# Patient Record
Sex: MALE | Race: WHITE | URBAN - METROPOLITAN AREA
[De-identification: names, ages, dates, MRNs, and addresses within clinical notes are randomized per-mention and may not be internally consistent; named-entity substitution may affect disease eponyms.]

---

## 2017-10-03 NOTE — PROGRESS NOTES
"Immunizations updated from [facility].   Tdap vaccine- Declined   Flu Vaccine-Declined  PETER- 7 score was 3; documented in EPIC  PHQ-9 score was 7; documented in EPIC  Not aware of any family history of anxiety or depression.   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 Medication,  Found this on line as an idea Modafinil a wakefulness-promoting drug; would like to know about it. Graduated in  May 2017 and started new job in August as an  at [employer]. Since starting new job, has been having difficulties with staying and remaining focused and alert for work; only at work.     He read about \"nutropics\"  He was asking about Modafinil. I reviewed the risks of this drug and did NOT prescribe this today.      \"discuss how to be more focused\"    Estimated body mass index is 39.88 kg/(m^2) as calculated from the following:    Height as of this encounter: 1.816 m (5' 11.5\").    Weight as of this encounter: 131.5 kg (290 lb).  Weight loss recommended. Offered referral to weight loss clinic- deferred by patient.    Sleep: 8 hours of sleep; no difficulties with falling or staying asleep / Denies JACQUELINE/RLS  Nocturia: none   Appetite: Normal; but has occasional late night snacks   Exercise: Biking to and from work; about 20-40 minutes and about 4 days per week     Smokin cigarette per week; socially (encouraged him to quit)  ETOH: 3 drinks per week   Street drugs/MJ: Marijuana- daily use (encouraged him to quit)  Caffeine: 8-10 cups of coffee which is approximately about 800 mg of caffeine per day; except on weekend when he is \"more relaxed and there is less pressure\".     Education: BS Accounting and Finance " "  Job:  at [employer]   Hobbies: photography, cooking, and baking   Relationships: not currently in a relationship; single     Depression NO  Anxiety YES, reports social anxiety. No counseling. No medication.  Panic NO  SI/SP no  Hallucinations NO  Paranoid NO  Manic NO  OCD NO  PTSD NO  Gambling NO  Guns no  \"Sometimes nilalist, not right now.\"    PHQ9=7  PETER=3    Parents in the past focused on prayer  Not a support currently    Best friend ?-good friends, but no one as best  Brother-fairly close      Problem list and histories reviewed & adjusted, as indicated.  Additional history: as documented    Patient Active Problem List   Diagnosis     Health Care Home     No past surgical history on file.    Social History   Substance Use Topics     Smoking status: Current Some Day Smoker     Types: Cigarettes     Smokeless tobacco: Never Used      Comment: 1 cigarette per week; socially      Alcohol use Yes      Comment: 3 drinks per week      Family History   Problem Relation Age of Onset     Hyperlipidemia Father      Hypertension Father      CANCER Maternal Grandmother      CANCER Maternal Grandfather      Myocardial Infarction Paternal Grandfather              Reviewed and updated as needed this visit by clinical staff       Reviewed and updated as needed this visit by Provider       Sometimes not motivated to do things.  Biking to work. Helmet no, yes light, reflective clothing no      Trouble with focus on task   Examples Excel spreadsheets longer periods then was in , shared cubicle 1/2 wall. Quiet. He says listening to things all day. Consider taking breaks. Consider setting timer.  Getting distracted  Sometimes distracted in college, but that time was more flexible  (Consider Adult ADD inattentive- referred for evaluation)    No cataplexy    No abuse    Chem dep not other than MJ    Learning issues as child no  LD no  ADD as child no    Snore more as a child  RLS no  Apnea no    Heavy sleeper per his " "report    Tetanus ?  Flu declined today     ROS: 10 point ROS neg other than the symptoms noted above in the HPI.  Wondering about allergy fall/spring runny nose tried benadryl, zyrtec working better, Claritin not as good as zyrtec.   /80  Pulse 66  Temp 99.1  F (37.3  C) (Oral)  Resp 16  Ht 1.816 m (5' 11.5\")  Wt 131.5 kg (290 lb)  SpO2 97%  BMI 39.88 kg/m2  Exam:  Constitutional: healthy, alert and no distress  Head: Normocephalic. No masses, lesions, tenderness or abnormalities  Neck: Neck supple. No adenopathy. Thyroid symmetric, normal size,, Carotids without bruits.  ENT: ENT exam normal, no neck nodes or sinus tenderness  Cardiovascular: negative, PMI normal. No lifts, heaves, or thrills. RRR. No murmurs, clicks gallops or rub  Respiratory: negative, Percussion normal. Good diaphragmatic excursion. Lungs clear  Gastrointestinal: Abdomen soft, non-tender. BS normal. No masses, organomegaly  : Deferred  Musculoskeletal: extremities normal- no gross deformities noted, gait normal and normal muscle tone  Skin: no suspicious lesions or rashes  Neurologic: Gait normal. Reflexes normal and symmetric. Sensation grossly WNL.  Psychiatric: mentation appears normal and affect normal/bright  Hematologic/Lymphatic/Immunologic: Normal cervical lymph nodes    Diet   Breakfast yogurt greek, occasional oatmeal  Lunch meat and cheese, granola bar, fruit strip  Dinner with family pasta, meatloaf, hamburger helper, veggies beans, corn, peas  Snacks sweets, cookies, ice cream- boredom sometime    Consider reduction of portions/use smaller plates  Late snacking is an issue for him  Consider pre-plan snacks  Consider volunteering  Consider jointing photography class/club      TV falling asleep all the time. 40 min on average  Reading a book 20 min  (Consider sleep consult- not ordered during this appointment)    FHX positive for Hyperlipidemia/HTN/Cancer/Heart disease    ASSESSMENT / PLAN:  (Z76.89) Establishing care " with new doctor, encounter for  (primary encounter diagnosis)  Comment:   Plan: updated history, HCM reviewed    (F90.0) Attention deficit hyperactivity disorder (ADHD), predominantly inattentive type  Comment: consider this as a possible diagnosis. Sent for assessment. Discussed behavioral changes to consider.  Plan: MENTAL HEALTH REFERRAL          (F40.10) Social anxiety disorder  Comment: trial of Paxil. Discussed [place] Volunteer as a way to decrease boredom isolation and increase purpose. Use his photography interest also to practice networking/social skills. Consider counseling. Discussed phone APPS for meditation etc.  Plan: MENTAL HEALTH REFERRAL, PARoxetine (PAXIL) 20         MG tablet            (E66.9) Obesity (BMI 30-39.9)  Comment: Consider sleep consult in the future. Discussed in detail weight loss suggestions.Discussed My Fitness Pal UDAY as one monitoring option.  Plan: Diet and exercise. Consider referral in the future per patient desire.     (F12.90) Marijuana use, episodic  Comment:   Plan: Encouraged him to eliminate this    Vera Murphy MD, MEd      60 min spent in direct face to face time with this pt, greater than 50% in counseling  Re-establish care,Mood, weight, focus issues and coordination of care.

## 2017-10-09 ENCOUNTER — OFFICE VISIT (OUTPATIENT)
Dept: FAMILY MEDICINE | Facility: CLINIC | Age: 22
End: 2017-10-09

## 2017-10-09 VITALS
HEART RATE: 66 BPM | DIASTOLIC BLOOD PRESSURE: 80 MMHG | HEIGHT: 72 IN | WEIGHT: 290 LBS | SYSTOLIC BLOOD PRESSURE: 140 MMHG | BODY MASS INDEX: 39.28 KG/M2 | RESPIRATION RATE: 16 BRPM | OXYGEN SATURATION: 97 % | TEMPERATURE: 99.1 F

## 2017-10-09 DIAGNOSIS — F90.0 ATTENTION DEFICIT HYPERACTIVITY DISORDER (ADHD), PREDOMINANTLY INATTENTIVE TYPE: ICD-10-CM

## 2017-10-09 DIAGNOSIS — F40.10 SOCIAL ANXIETY DISORDER: ICD-10-CM

## 2017-10-09 DIAGNOSIS — E66.9 OBESITY (BMI 30-39.9): ICD-10-CM

## 2017-10-09 DIAGNOSIS — F12.90 MARIJUANA USE, EPISODIC: ICD-10-CM

## 2017-10-09 DIAGNOSIS — Z76.89 ESTABLISHING CARE WITH NEW DOCTOR, ENCOUNTER FOR: Primary | ICD-10-CM

## 2017-10-09 PROCEDURE — 99205 OFFICE O/P NEW HI 60 MIN: CPT | Performed by: FAMILY MEDICINE

## 2017-10-09 RX ORDER — PAROXETINE 20 MG/1
20 TABLET, FILM COATED ORAL AT BEDTIME
Qty: 30 TABLET | Refills: 1 | Status: SHIPPED | OUTPATIENT
Start: 2017-10-09 | End: 2018-07-12

## 2017-10-09 ASSESSMENT — ANXIETY QUESTIONNAIRES
5. BEING SO RESTLESS THAT IT IS HARD TO SIT STILL: SEVERAL DAYS
IF YOU CHECKED OFF ANY PROBLEMS ON THIS QUESTIONNAIRE, HOW DIFFICULT HAVE THESE PROBLEMS MADE IT FOR YOU TO DO YOUR WORK, TAKE CARE OF THINGS AT HOME, OR GET ALONG WITH OTHER PEOPLE: NOT DIFFICULT AT ALL
3. WORRYING TOO MUCH ABOUT DIFFERENT THINGS: NOT AT ALL
1. FEELING NERVOUS, ANXIOUS, OR ON EDGE: SEVERAL DAYS
6. BECOMING EASILY ANNOYED OR IRRITABLE: NOT AT ALL
7. FEELING AFRAID AS IF SOMETHING AWFUL MIGHT HAPPEN: NOT AT ALL
2. NOT BEING ABLE TO STOP OR CONTROL WORRYING: NOT AT ALL
GAD7 TOTAL SCORE: 3

## 2017-10-09 ASSESSMENT — PATIENT HEALTH QUESTIONNAIRE - PHQ9
SUM OF ALL RESPONSES TO PHQ QUESTIONS 1-9: 7
5. POOR APPETITE OR OVEREATING: SEVERAL DAYS

## 2017-10-09 NOTE — MR AVS SNAPSHOT
After Visit Summary   10/9/2017    Ricardo Aguilera    MRN: [id number]           Patient Information     Date Of Birth          1995        Visit Information        Provider Department      10/9/2017 4:00 PM Vera Murphy MD [facility]        Today's Diagnoses     Attention deficit hyperactivity disorder (ADHD), predominantly inattentive type    -  1    Social anxiety disorder          Care Instructions    Allergy add flonase OTC allergy eye drops and zyrtec    Meditation  Calm  Head space    My Fitness Pal.com  Use small plates    [place] Volunteer  Consider local    paxil 20mg                     Follow-ups after your visit        Additional Services     MENTAL HEALTH REFERRAL       Your provider has referred you to: [facility]: [facility] - ADHD & Bariatric Assessments - Adult ADHD Evaluations - [facility] [phone]   [email or web address]   *Please call to schedule an appointment.    All scheduling is subject to the client's specific insurance plan & benefits, provider/location availability, and provider clinical specialities.  Please arrive 15 minutes early for your first appointment and bring your completed paperwork.    Please be aware that coverage of these services is subject to the terms and limitations of your health insurance plan.  Call member services at your health plan with any benefit or coverage questions.                  Who to contact     If you have questions or need follow up information about today's clinic visit or your schedule please contact [facility] directly at [phone].  Normal or non-critical lab and imaging results will be communicated to you by MyChart, letter or phone within 4 business days after the clinic has received the results. If you do not hear from us within 7 days, please contact the clinic through MyChart or phone. If you have a critical  "or abnormal lab result, we will notify you by phone as soon as possible.  Submit refill requests through [employer] or call your pharmacy and they will forward the refill request to us. Please allow 3 business days for your refill to be completed.          Additional Information About Your Visit        iovoxhart Information     [employer] lets you send messages to your doctor, view your test results, renew your prescriptions, schedule appointments and more. To sign up, go to [email or web address] . Click on \"Log in\" on the left side of the screen, which will take you to the Welcome page. Then click on \"Sign up Now\" on the right side of the page.     You will be asked to enter the access code listed below, as well as some personal information. Please follow the directions to create your username and password.     Your access code is: [email or web address]  Expires: 2018  5:18 PM     Your access code will  in 90 days. If you need help or a new code, please call your [facility] clinic or [phone].        Care EveryWhere ID     This is your Care EveryWhere ID. This could be used by other organizations to access your [facility] medical records  [id number]        Your Vitals Were     Pulse Temperature Respirations Height Pulse Oximetry BMI (Body Mass Index)    66 99.1  F (37.3  C) (Oral) 16 1.816 m (5' 11.5\") 97% 39.88 kg/m2       Blood Pressure from Last 3 Encounters:   10/09/17 140/80   14 110/82   14 118/82    Weight from Last 3 Encounters:   10/09/17 131.5 kg (290 lb)   14 (!) 136.3 kg (300 lb 6.4 oz) (>99 %)*   14 135 kg (297 lb 9.6 oz) (>99 %)*     * Growth percentiles are based on CDC 2-20 Years data.              We Performed the Following     MENTAL HEALTH REFERRAL          Today's Medication Changes          These changes are accurate as of: 10/9/17  5:18 PM.  If you have any questions, ask your nurse or doctor.               Start taking these medicines.        Dose/Directions    PARoxetine " 20 MG tablet   Commonly known as:  PAXIL   Used for:  Social anxiety disorder   Started by:  Vera Murphy MD        Dose:  20 mg   Take 1 tablet (20 mg) by mouth At Bedtime   Quantity:  30 tablet   Refills:  1            Where to get your medicines      These medications were sent to [id number] - [place], [place] - [address]  [address], [place] [address]     Phone:  [phone]     PARoxetine 20 MG tablet                Primary Care Provider Office Phone # Fax #    Vera Murphy [phone] [address] [place] [address]        Equal Access to Services     [facility]: Hadii aad ku hadasho Soomaali, waaxda luqadaha, qaybta kaalmada adeegyada, waxfelicia branham . So [facility] [phone].    ATENCIÓN: Si habla español, tiene a starr disposición servicios gratuitos de asistencia lingüística. Llame al [phone].    We comply with applicable federal civil rights laws and [place] laws. We do not discriminate on the basis of race, color, national origin, age, disability, sex, sexual orientation, or gender identity.            Thank you!     Thank you for choosing [facility]  for your care. Our goal is always to provide you with excellent care. Hearing back from our patients is one way we can continue to improve our services. Please take a few minutes to complete the written survey that you may receive in the mail after your visit with us. Thank you!             Your Updated Medication List - Protect others around you: Learn how to safely use, store and throw away your medicines at www.disposemymeds.org.          This list is accurate as of: 10/9/17  5:18 PM.  Always use your most recent med list.                   Brand Name Dispense Instructions for use Diagnosis    acetaminophen-codeine 300-30 MG per tablet    TYLENOL w/CODEINE No. 3    24 tablet    Take 1-2 tablets by mouth every 4 hours as needed for pain    Warts        cimetidine 400 MG tablet    TAGAMET    60 tablet    Take 1 tablet (400 mg) by mouth 2 times daily    Warts       PARoxetine 20 MG tablet    PAXIL    30 tablet    Take 1 tablet (20 mg) by mouth At Bedtime    Social anxiety disorder

## 2017-10-09 NOTE — PATIENT INSTRUCTIONS
Allergy add flonase OTC allergy eye drops and zyrtec    Meditation  Calm  Head space    My Fitness Pal.com  Use small plates    [place] Volunteer  Consider local    paxil 20mg

## 2017-10-10 PROBLEM — F90.0 ATTENTION DEFICIT HYPERACTIVITY DISORDER (ADHD), PREDOMINANTLY INATTENTIVE TYPE: Status: ACTIVE | Noted: 2017-10-10

## 2017-10-10 PROBLEM — E66.9 OBESITY (BMI 30-39.9): Status: ACTIVE | Noted: 2017-10-10

## 2017-10-10 PROBLEM — F40.10 SOCIAL ANXIETY DISORDER: Status: ACTIVE | Noted: 2017-10-10

## 2017-10-10 PROBLEM — F12.90 MARIJUANA USE, EPISODIC: Status: ACTIVE | Noted: 2017-10-10

## 2017-10-10 ASSESSMENT — ANXIETY QUESTIONNAIRES: GAD7 TOTAL SCORE: 3

## 2018-07-12 ENCOUNTER — OFFICE VISIT (OUTPATIENT)
Dept: FAMILY MEDICINE | Facility: CLINIC | Age: 23
End: 2018-07-12

## 2018-07-12 VITALS
DIASTOLIC BLOOD PRESSURE: 80 MMHG | HEIGHT: 72 IN | RESPIRATION RATE: 16 BRPM | BODY MASS INDEX: 38.74 KG/M2 | OXYGEN SATURATION: 99 % | WEIGHT: 286 LBS | SYSTOLIC BLOOD PRESSURE: 124 MMHG | HEART RATE: 69 BPM

## 2018-07-12 DIAGNOSIS — Z23 NEED FOR PROPHYLACTIC VACCINATION AGAINST HUMAN PAPILLOMAVIRUS: ICD-10-CM

## 2018-07-12 DIAGNOSIS — E66.812 CLASS 2 OBESITY DUE TO EXCESS CALORIES WITHOUT SERIOUS COMORBIDITY WITH BODY MASS INDEX (BMI) OF 39.0 TO 39.9 IN ADULT: Chronic | ICD-10-CM

## 2018-07-12 DIAGNOSIS — Z23 NEED FOR MENINGOCOCCAL VACCINATION: ICD-10-CM

## 2018-07-12 DIAGNOSIS — F40.10 SOCIAL ANXIETY DISORDER: ICD-10-CM

## 2018-07-12 DIAGNOSIS — Z11.3 ROUTINE SCREENING FOR STI (SEXUALLY TRANSMITTED INFECTION): ICD-10-CM

## 2018-07-12 DIAGNOSIS — Z23 NEED FOR VACCINE FOR TD (TETANUS-DIPHTHERIA): ICD-10-CM

## 2018-07-12 DIAGNOSIS — Z72.0 TOBACCO USE: ICD-10-CM

## 2018-07-12 DIAGNOSIS — E66.09 CLASS 2 OBESITY DUE TO EXCESS CALORIES WITHOUT SERIOUS COMORBIDITY WITH BODY MASS INDEX (BMI) OF 39.0 TO 39.9 IN ADULT: Chronic | ICD-10-CM

## 2018-07-12 DIAGNOSIS — Z02.89 ENCOUNTER FOR COMPLETION OF FORM WITH PATIENT: Primary | ICD-10-CM

## 2018-07-12 DIAGNOSIS — Z01.83 ENCOUNTER FOR BLOOD TYPING: ICD-10-CM

## 2018-07-12 PROCEDURE — 90651 9VHPV VACCINE 2/3 DOSE IM: CPT | Performed by: FAMILY MEDICINE

## 2018-07-12 PROCEDURE — 36415 COLL VENOUS BLD VENIPUNCTURE: CPT | Performed by: FAMILY MEDICINE

## 2018-07-12 PROCEDURE — 90714 TD VACC NO PRESV 7 YRS+ IM: CPT | Performed by: FAMILY MEDICINE

## 2018-07-12 PROCEDURE — 87491 CHLMYD TRACH DNA AMP PROBE: CPT | Mod: 90 | Performed by: FAMILY MEDICINE

## 2018-07-12 PROCEDURE — 99214 OFFICE O/P EST MOD 30 MIN: CPT | Mod: 25 | Performed by: FAMILY MEDICINE

## 2018-07-12 PROCEDURE — 90472 IMMUNIZATION ADMIN EACH ADD: CPT | Performed by: FAMILY MEDICINE

## 2018-07-12 PROCEDURE — 86900 BLOOD TYPING SEROLOGIC ABO: CPT | Mod: 90 | Performed by: FAMILY MEDICINE

## 2018-07-12 PROCEDURE — 87591 N.GONORRHOEAE DNA AMP PROB: CPT | Mod: 90 | Performed by: FAMILY MEDICINE

## 2018-07-12 PROCEDURE — 90471 IMMUNIZATION ADMIN: CPT | Performed by: FAMILY MEDICINE

## 2018-07-12 PROCEDURE — 90734 MENACWYD/MENACWYCRM VACC IM: CPT | Performed by: FAMILY MEDICINE

## 2018-07-12 RX ORDER — AZITHROMYCIN 250 MG/1
500 TABLET, FILM COATED ORAL DAILY
Qty: 6 TABLET | Refills: 0 | Status: CANCELLED | OUTPATIENT
Start: 2018-07-12

## 2018-07-12 RX ORDER — ONDANSETRON 4 MG/1
4 TABLET, FILM COATED ORAL EVERY 8 HOURS PRN
Qty: 6 TABLET | Refills: 0 | Status: CANCELLED | OUTPATIENT
Start: 2018-07-12

## 2018-07-12 NOTE — LETTER
"[facility] [address] [place]  [address]  Phone: [phone]    July 13, 2018      Ricardo Aguilera  [facility] [facility] [address]              Dear Ricardo,    The results from your recent visit showed your HIV test is negative as we expected.   Your blood type is A positive.     Enjoy your time in [place]!    Sincerely,           Justa \"Quyen\" MD Ric    Results for orders placed or performed in visit on 07/12/18   HIV 1/0/2 Rflx ([facility])   Result Value Ref Range    HIV Screen 4th Gen with Rflx Non Reactive Non Reactive    Narrative    Performed at:  01 - [facility] [address], [place], [place]  [id number]  : Duong Chahal MD, Phone:  [phone]   ABO Grouping and Rho(D) Typing ([facility])   Result Value Ref Range    ABO A     Rh Factor Positive     Narrative    Performed at:  01 - [facility] [address], [place], [place]  [id number]  : Duong Chahal MD, Phone:  [phone]      "

## 2018-07-12 NOTE — MR AVS SNAPSHOT
After Visit Summary   7/12/2018    Ricardo Aguilera    MRN: [id number]           Patient Information     Date Of Birth          1995        Visit Information        Provider Department      7/12/2018 10:30 AM Justa Larios MD [facility]        Today's Diagnoses     Encounter for completion of form with patient    -  1    Encounter for blood typing        Class 2 obesity due to excess calories without serious comorbidity with body mass index (BMI) of 39.0 to 39.9 in adult        Tobacco use        Social anxiety disorder        Need for vaccine for TD (tetanus-diphtheria)        Routine screening for STI (sexually transmitted infection)        Need for meningococcal vaccination        Need for prophylactic vaccination against human papillomavirus          Care Instructions    When you bring your photo in, please also bring in the supplements you are taking so we can photocopy the label.           Follow-ups after your visit        Who to contact     If you have questions or need follow up information about today's clinic visit or your schedule please contact [facility] directly at [phone].  Normal or non-critical lab and imaging results will be communicated to you by [employer], letter or phone within 4 business days after the clinic has received the results. If you do not hear from us within 7 days, please contact the clinic through [employer] or phone. If you have a critical or abnormal lab result, we will notify you by phone as soon as possible.  Submit refill requests through [employer] or call your pharmacy and they will forward the refill request to us. Please allow 3 business days for your refill to be completed.          Additional Information About Your Visit        MyChart Information     [employer] lets you send messages to your doctor, view your test results, renew your prescriptions, schedule appointments and more. To sign up, go to  "[email or web address] . Click on \"Log in\" on the left side of the screen, which will take you to the Welcome page. Then click on \"Sign up Now\" on the right side of the page.     You will be asked to enter the access code listed below, as well as some personal information. Please follow the directions to create your username and password.     Your access code is: [email or web address]  Expires: 10/16/2018 11:00 PM     Your access code will  in 90 days. If you need help or a new code, please call your [facility] clinic or [phone].        Care EveryWhere ID     This is your Care EveryWhere ID. This could be used by other organizations to access your [place] medical records  [id number]        Your Vitals Were     Pulse Respirations Height Pulse Oximetry BMI (Body Mass Index)       69 16 1.822 m (5' 11.75\") 99% 39.06 kg/m2        Blood Pressure from Last 3 Encounters:   18 124/80   10/09/17 140/80   14 110/82    Weight from Last 3 Encounters:   18 129.7 kg (286 lb)   10/09/17 131.5 kg (290 lb)   14 (!) 136.3 kg (300 lb 6.4 oz) (>99 %)*     * Growth percentiles are based on [facility] 2-20 Years data.              We Performed the Following     ABO Grouping and Rho(D) Typing ([facility])     ADMIN 1st VACCINE     C TD PRSERV FREE >=7 YRS ADS IM     Chlamydia/GC Amplification ([facility])     EA ADD'L VACCINE     HIV 1/0/2 Rflx ([facility])     HPV, IM (9 - 26 YRS) - Gardasil 9     MCV4, MENINGOCOCCAL CONJ, IM (2 MO - 55 YRS) - Menveo     VENOUS COLLECTION          Today's Medication Changes          These changes are accurate as of 18 11:59 PM.  If you have any questions, ask your nurse or doctor.               Stop taking these medicines if you haven't already. Please contact your care team if you have questions.     PARoxetine 20 MG tablet   Commonly known as:  PAXIL   Stopped by:  Justa Larios MD                    Primary Care Provider Office Phone # Fax #    Vera Murphy MD " [phone]       [address]  [facility] [address]        Equal Access to Services     JEAN YOUNGBLOOD : Hadii aaliyah hankins nakul Kincaid, wamaintada ludaljit, liana kadelmarda murphy, jazzmine green. So [facility] [place] [phone].    ATENCIÓN: Si habla español, tiene a starr disposición servicios gratuitos de asistencia lingüística. Llame al [phone].    We comply with applicable federal civil rights laws and [place] laws. We do not discriminate on the basis of race, color, national origin, age, disability, sex, sexual orientation, or gender identity.            Thank you!     Thank you for choosing [facility]  for your care. Our goal is always to provide you with excellent care. Hearing back from our patients is one way we can continue to improve our services. Please take a few minutes to complete the written survey that you may receive in the mail after your visit with us. Thank you!             Your Updated Medication List - Protect others around you: Learn how to safely use, store and throw away your medicines at www.disposemymeds.org.      Notice  As of 7/12/2018 11:59 PM    You have not been prescribed any medications.

## 2018-07-12 NOTE — PATIENT INSTRUCTIONS
When you bring your photo in, please also bring in the supplements you are taking so we can photocopy the label.

## 2018-07-12 NOTE — PROGRESS NOTES
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 Could be there for a year; maybe longer. Teaching night classes and weekends. Working as an  now. Looking forward to the travel. He thinks he will be spending most of his time actually in [facility]. He is working through a company who routinely coordinates these assignments. He brings in a form that needs to be completed by his doctor to obtain is travel visa. Form requests blood typing and HIV status. Has not been to a travel clinic.     Form also requests EKG and CXR. Pt has not had a reason for a CXR. No cough. No known TB exposure.   Has not had an EKG. No prior indication. No HTN, hyperlipidemia, CP, asthma.     Has completed Hep A, Hep B, MMR, varicella and polio.  Has only 1 meningococcal vaccination.   Has not had any HPV yet.   Overdue for tetanus. Had Tdap 2006.     VISION   No corrective lenses  Tool used: Wayne   Right eye:        10/16 (20/32)   Left eye:          10/16 (20/32)   Visual Acuity: Pass  Both eyes 20/25  Has rx for glasses, but does not wear them.     He previously saw Dr. Murphy for social anxiety. She rx'd Celexa. He readily agrees w/ dx, but opted not to fill the rx. He wants to manage on his own now.     Taking Piracetam and other OTC neurotropics for brain function. - will bring in bottles so we can copy the labels, enter on med list to assess for med interactions. Has [facility] dx added to Problem List last fall.     Problem list, Medication list, Allergies, and Medical/Social/Surgical histories reviewed in [employer] and updated as appropriate.     ROS:  5 point ROS completed and negative except noted above, including Gen, CV, Resp, GI, MS    Histories:   Patient Active Problem List  "  Diagnosis     Health Care Home     Social anxiety disorder     Obesity (BMI 30-39.9)     Marijuana use, episodic     Attention deficit hyperactivity disorder (ADHD), predominantly inattentive type     Tobacco use     History reviewed. No pertinent past medical history. Social anxiety; no other dx.   No past surgical history on file.   No surgeries.   Social History   Substance Use Topics     Smoking status: Current Some Day Smoker     Types: Cigarettes     Smokeless tobacco: Never Used      Comment: 1 cigarette per week; socially      Alcohol use Yes      Comment: 3 drinks per week      Family History   Problem Relation Age of Onset     Hyperlipidemia Father      Hypertension Father      Cancer Maternal Grandmother      Cancer Maternal Grandfather      Myocardial Infarction Paternal Grandfather      OBJECTIVE:                                                    /80  Pulse 69  Resp 16  Ht 1.822 m (5' 11.75\")  Wt 129.7 kg (286 lb)  SpO2 99%  BMI 39.06 kg/m2  Body mass index is 39.06 kg/(m^2).   Gen: Cooperative. No acute distress. Obese body habitus noted.   Eyes: PERRL, sclera white.   Ears/Nose/Mouth/Throat: Normal pinnae and ear canals, TMs without erythema or effusion. Oropharynx mucous membranes moist, without lesions, erythema, or exudate.   Neck: Supple, without masses, lymphadenopathy or tenderness.   Heart: Regular rate and rhythm without murmurs, rubs, or gallops.   Lungs: Normal respiratory effort. Lungs are clear to auscultation. Good breath sounds bilaterally.   Musculoskeletal: No lower extremity edema.   Skin: Warm and well perfused.   Neurologic: Alert, oriented x3, nonfocal. CN II-XII grossly intact. Strength 5/5. Sensation intact.   Psych:  Mood and affect are appropriate. Social anxiety d/o not obvious today.   Nml exam w/ exception of obesity.      ASSESSMENT/PLAN:                                                      Ricardo was seen today for forms.    Diagnoses and all orders for " this visit:    Encounter for completion of form with patient   Completed as much of the form as we could today.    He will bring in the requisite passport type photo for my signature per the directions of his employer   We will finalize the form when he brings photos in and we have lab results.    We agreed no addt'l benefit of obtaining EKG or CXR today.    Clearly indicated this was not indicated on his visa application form.     Encounter for blood typing  -     Cancel: ABO and Rh  -     VENOUS COLLECTION  -     ABO Grouping and Rho(D) Typing ([facility])    Class 2 obesity due to excess calories without serious comorbidity with body mass index (BMI) of 39.0 to 39.9 in adult   Encouraged eating right-sized portions of heart healthy diet and increasing level of physical activity.     Tobacco use   Pt smokes when out for drinks.    States goes long periods of time w/o smoking.   Counseled on tobacco cessation.     Need for vaccine for TD (tetanus-diphtheria)  -     C TD PRSERV FREE >=7 YRS ADS IM  -     ADMIN 1st VACCINE    Routine screening for STI (sexually transmitted infection)  -     Chlamydia/GC Amplification ([facility]) - for [place] [facility] recs  -     HIV 1/0/2 Rflx ([facility]) - specifically requested on form.   -     VENOUS COLLECTION  -     ABO Grouping and Rho(D) Typing ([facility]) - discussed likely not beneficial b/c if needed a blood transfusion, additional screening would be required. However, is a required element on his form.     Need for meningococcal vaccination  -     MCV4, MENINGOCOCCAL CONJ, IM (2 MO - 55 YRS) - Menveo  -     EA ADD'L VACCINE    Need for prophylactic vaccination against human papillomavirus  -     EA ADD'L VACCINE  -     HPV, IM (9 - 26 YRS) - Gardasil 9    Other orders  -     Cancel: azithromycin (ZITHROMAX) 250 MG tablet; Take 2 tablets (500 mg) by mouth daily For travelers diarrhea.  -     Cancel: ondansetron (ZOFRAN) 4 MG tablet; Take 1 tablet (4 mg) by mouth every 8 hours as  needed for nausea or vomiting While traveling in [place].   Pt states he will not need rx for infections and n/v while traveling. No rx sent to pharmacy.     Counseling provided on all vaccinations given today and anticipated before he leaves for [employer].   RTC per schedule provided by nursing to complete vaccination series.     >25 min spent with patient, greater than 50% spent on discussion/education/planning, etc. About The primary encounter diagnosis was Encounter for completion of form with patient. Diagnoses of Encounter for blood typing, Class 2 obesity due to excess calories without serious comorbidity with body mass index (BMI) of 39.0 to 39.9 in adult, Tobacco use, Social anxiety disorder, Need for vaccine for TD (tetanus-diphtheria), Routine screening for STI (sexually transmitted infection), Need for meningococcal vaccination, and Need for prophylactic vaccination against human papillomavirus were also pertinent to this visit.    Patient Instructions   When you bring your photo in, please also bring in the supplements you are taking so we can photocopy the label.     Addendum: Pt brought in photos - I signed the photo as directed and we attached it to the form. Completed form. Results letter given to pt. Original and a copy given to patient. Copy sent to scanning.   Pt also brought in jar of Piracetam. Label copied and sent to scanning. He did not bring in any of his other supplements. These remain unknown.     The following health maintenance items are reviewed in Epic and correct as of today:  Health Maintenance   Topic Date Due     HIV SCREEN (SYSTEM ASSIGNED)  10/12/2012     INFLUENZA VACCINE (1) 09/01/2018     PHQ-2 Q1 YR  10/09/2018     TETANUS IMMUNIZATION (SYSTEM ASSIGNED)  07/12/2028     HPV IMMUNIZATION  Aged Out       Justa Larios MD  Family Medicine    For any issues, please call my office  [facility] at [phone].

## 2018-07-12 NOTE — NURSING NOTE
Patient is very afraid of needles and had his first blood draw done. Sweating and breathing heavily. After immunizations gave him apple juice and let him sit for a few minutes because he has not eaten yet today. Patient was feeling better after drinking some apple juice and was able to go.

## 2018-07-12 NOTE — LETTER
"[facility]  [address] [place]  [address]  Phone: [phone]    July 16, 2018      Ricardo Aguilera  [address] [facility] [address]              Dear Ricardo,    Your test results are now all available and everything looks good as we expected.   Enjoy your time in [place]!        Sincerely,     Justa \"Quyen\" MD Ric  Farzana, CMA    Results for orders placed or performed in visit on 07/12/18   Chlamydia/GC Amplification ([facility])   Result Value Ref Range    Chlamydia Trachomatis Cary Negative Negative    Neisseria gonorrhoeae, CARY Negative Negative    Narrative    Performed at:  01 - [facility] [place]  [address], [place], [place]  [id number]  : Aleksandr Jorgensen MD, Phone:  [phone]   HIV 1/0/2 Rflx ([facility])   Result Value Ref Range    HIV Screen 4th Gen with Rflx Non Reactive Non Reactive    Narrative    Performed at:  01 - [facility] [address], [place], [place]  [id number]  : Duong Chahal MD, Phone:  [phone]   ABO Grouping and Rho(D) Typing ([facility])   Result Value Ref Range    ABO A     Rh Factor Positive     Narrative    Performed at:  01 - [facility] [address], [place], [place]  [id number]  : Duong Chahal MD, Phone:  [phone]      "

## 2018-07-13 LAB
ABO + RH BLD: NORMAL
HIV SCREEN 4TH GEN WITH RFLX: NON REACTIVE
RH FACTOR: POSITIVE

## 2018-07-14 LAB
C TRACH DNA SPEC QL PROBE+SIG AMP: NEGATIVE
N GONORRHOEA DNA SPEC QL PROBE+SIG AMP: NEGATIVE

## 2018-07-16 PROBLEM — E66.09 OBESITY DUE TO EXCESS CALORIES: Chronic | Status: ACTIVE | Noted: 2018-07-12

## 2018-07-16 PROBLEM — E66.09 OBESITY DUE TO EXCESS CALORIES: Chronic | Status: RESOLVED | Noted: 2018-07-12 | Resolved: 2018-07-16

## 2018-07-18 PROBLEM — Z72.0 TOBACCO USE: Status: ACTIVE | Noted: 2018-07-18

## 2018-10-12 ENCOUNTER — TELEPHONE (OUTPATIENT)
Dept: FAMILY MEDICINE | Facility: CLINIC | Age: 23
End: 2018-10-12

## 2018-10-12 DIAGNOSIS — R11.0 NAUSEA: ICD-10-CM

## 2018-10-12 DIAGNOSIS — A09 TRAVELER'S DIARRHEA: Primary | ICD-10-CM

## 2018-10-12 RX ORDER — ONDANSETRON 4 MG/1
4-8 TABLET, ORALLY DISINTEGRATING ORAL EVERY 6 HOURS PRN
Qty: 20 TABLET | Refills: 0 | Status: SHIPPED | OUTPATIENT
Start: 2018-10-12

## 2018-10-12 RX ORDER — AZITHROMYCIN 500 MG/1
TABLET, FILM COATED ORAL
Qty: 15 TABLET | Refills: 0 | Status: SHIPPED | OUTPATIENT
Start: 2018-10-12

## 2018-10-12 NOTE — TELEPHONE ENCOUNTER
Patient called requesting prescription for travelers diarrhea for upcoming year long trip to [place].  Per Dr Stokes prescription for zithromax and zofran sent to pharmacy.  Patient is scheduled for typhoid immunization 10/19/18.  Adriana Dejesus

## 2018-10-19 DIAGNOSIS — Z23 NEED FOR IMMUNIZATION AGAINST TYPHOID: Primary | ICD-10-CM

## 2018-10-19 PROCEDURE — 90691 TYPHOID VACCINE IM: CPT | Performed by: FAMILY MEDICINE

## 2018-10-19 PROCEDURE — 90471 IMMUNIZATION ADMIN: CPT | Performed by: FAMILY MEDICINE

## 2018-10-19 NOTE — MR AVS SNAPSHOT
"              After Visit Summary   10/19/2018    Ricardo Aguilera    MRN: [id number]           Patient Information     Date Of Birth          1995        Visit Information        Provider Department      10/19/2018 3:30 PM  [facility]        Today's Diagnoses     Need for immunization against typhoid    -  1       Follow-ups after your visit        Who to contact     If you have questions or need follow up information about today's clinic visit or your schedule please contact [facility] directly at [phone].  Normal or non-critical lab and imaging results will be communicated to you by [employer], letter or phone within 4 business days after the clinic has received the results. If you do not hear from us within 7 days, please contact the clinic through [employer] or phone. If you have a critical or abnormal lab result, we will notify you by phone as soon as possible.  Submit refill requests through [facility] or call your pharmacy and they will forward the refill request to us. Please allow 3 business days for your refill to be completed.          Additional Information About Your Visit        MyChart Information     [facility] lets you send messages to your doctor, view your test results, renew your prescriptions, schedule appointments and more. To sign up, go to [email or web address] . Click on \"Log in\" on the left side of the screen, which will take you to the Welcome page. Then click on \"Sign up Now\" on the right side of the page.     You will be asked to enter the access code listed below, as well as some personal information. Please follow the directions to create your username and password.     Your access code is: [email or web address]  Expires: 2019  3:41 PM     Your access code will  in 90 days. If you need help or a new code, please call your [place] clinic or [phone].        Care EveryWhere ID     This is your Care EveryWhere ID. This could be used by other " organizations to access your [facility] medical records  [id number]         Blood Pressure from Last 3 Encounters:   07/12/18 124/80   10/09/17 140/80   01/23/14 110/82    Weight from Last 3 Encounters:   07/12/18 129.7 kg (286 lb)   10/09/17 131.5 kg (290 lb)   01/23/14 (!) 136.3 kg (300 lb 6.4 oz) (>99 %)*     * Growth percentiles are based on [facility] 2-20 Years data.              We Performed the Following     ADMIN 1st VACCINE     TYPHOID VACCINE, IM        Primary Care Provider Office Phone # Fax #    Vera Murphy [phone] [address]  [facility] [address]        Equal Access to Services     CINDY YOUNGBLOOD : Hadii aad ku hadasho Soomaali, waaxda luqadaha, qaybta kaalmada adeegyada, waxay idiin hayspring branham . So [facility] [phone].    ATENCIÓN: Si habla español, tiene a starr disposición servicios gratuitos de asistencia lingüística. Llame al [phone].    We comply with applicable federal civil rights laws and [place] laws. We do not discriminate on the basis of race, color, national origin, age, disability, sex, sexual orientation, or gender identity.            Thank you!     Thank you for choosing [facility]  for your care. Our goal is always to provide you with excellent care. Hearing back from our patients is one way we can continue to improve our services. Please take a few minutes to complete the written survey that you may receive in the mail after your visit with us. Thank you!             Your Updated Medication List - Protect others around you: Learn how to safely use, store and throw away your medicines at www.disposemymeds.org.          This list is accurate as of 10/19/18  3:41 PM.  Always use your most recent med list.                   Brand Name Dispense Instructions for use Diagnosis    azithromycin 500 MG tablet    ZITHROMAX    15 tablet    Take 1 tablet daily for 3-5 days for travelers diarrhea    Traveler's diarrhea        ondansetron 4 MG ODT tab    ZOFRAN ODT    20 tablet    Take 1-2 tablets (4-8 mg) by mouth every 6 hours as needed for nausea    Nausea

## 2018-10-19 NOTE — NURSING NOTE
typhoid immunization for trip to [place]- ok'd per Dr Larios /Adriana Lizarraga RN [facility] Triage  Delphine Krishnan MA October 19, 2018 3:35 PM  Information given to patient to read first

## 2019-10-03 ENCOUNTER — HEALTH MAINTENANCE LETTER (OUTPATIENT)
Age: 24
End: 2019-10-03

## 2019-11-13 ENCOUNTER — OFFICE VISIT (OUTPATIENT)
Dept: FAMILY MEDICINE | Facility: CLINIC | Age: 24
End: 2019-11-13

## 2019-11-13 VITALS
RESPIRATION RATE: 16 BRPM | HEART RATE: 73 BPM | BODY MASS INDEX: 35.62 KG/M2 | HEIGHT: 72 IN | DIASTOLIC BLOOD PRESSURE: 80 MMHG | SYSTOLIC BLOOD PRESSURE: 122 MMHG | OXYGEN SATURATION: 98 % | WEIGHT: 263 LBS

## 2019-11-13 DIAGNOSIS — Z23 NEED FOR VACCINATION: ICD-10-CM

## 2019-11-13 DIAGNOSIS — E66.9 OBESITY (BMI 30-39.9): Primary | ICD-10-CM

## 2019-11-13 PROCEDURE — 90651 9VHPV VACCINE 2/3 DOSE IM: CPT | Performed by: FAMILY MEDICINE

## 2019-11-13 PROCEDURE — 99213 OFFICE O/P EST LOW 20 MIN: CPT | Mod: 25 | Performed by: FAMILY MEDICINE

## 2019-11-13 PROCEDURE — 90471 IMMUNIZATION ADMIN: CPT | Performed by: FAMILY MEDICINE

## 2019-11-13 ASSESSMENT — MIFFLIN-ST. JEOR: SCORE: 2215.96

## 2019-11-14 NOTE — PROGRESS NOTES
SUBJECTIVE: Ricardo Aguilera , a 24 year old  male, presents for counseling and information regarding upcoming travel to [place]. Special medical concerns   include: none   He. anticipates the following unusual   exposures: had typhoid IM vaccine last october    No past medical history on file.   Immunization History   Administered Date(s) Administered     DTaP, Unspecified 1995, 1995, 02/13/1995, 04/28/1995, 01/19/1996, 07/31/1998     HPV9 07/12/2018, 11/13/2019     HepA, Unspecified 06/01/2007, 07/29/2008     HepB, Unspecified 1995, 1995, 04/28/1995     Hib (PRP-T) 1994, 02/13/1995, 04/28/1995, 06/08/1995, 01/19/1996     MMR 01/19/1996, 07/31/1998, 01/12/2000     Meningococcal (Menomune ) 04/04/2006     Meningococcal (Menveo ) 07/12/2018     Polio, Unspecified  1994, 02/13/1995, 04/28/1995, 01/19/1996, 07/31/1998     TD (ADULT, 7+) 07/12/2018     Tdap (Adult) Unspecified Formulation 04/04/2006     Typhoid IM 10/19/2018     Varicella 06/02/1995, 01/19/1996, 06/01/2007       Current Outpatient Medications   Medication Sig Dispense Refill     azithromycin (ZITHROMAX) 500 MG tablet Take 1 tablet daily for 3-5 days for travelers diarrhea (Patient not taking: Reported on 11/13/2019) 15 tablet 0     ondansetron (ZOFRAN ODT) 4 MG ODT tab Take 1-2 tablets (4-8 mg) by mouth every 6 hours as needed for nausea (Patient not taking: Reported on 11/13/2019) 20 tablet 0     No Known Allergies     EXAM:   /80   Pulse 73   Resp 16   Ht 1.829 m (6')   Wt 119.3 kg (263 lb)   SpO2 98%   BMI 35.67 kg/m    GENERAL: obese, alert and no distress  EYES: Eyes grossly normal to inspection, PERRL and conjunctivae and sclerae normal  RESP: lungs clear to auscultation - no rales, rhonchi or wheezes  CV: regular rate and rhythm, normal S1 S2, no S3 or S4, no murmur, click or rub, no peripheral edema and peripheral pulses strong  MS: no gross musculoskeletal defects noted, no edema  SKIN: no  suspicious lesions or rashes  NEURO: Normal strength and tone, mentation intact and speech normal  PSYCH: mentation appears normal, affect normal/bright    Immunizations discussed include: Typhoid and flu, HPV  Malaraia prophylaxis recommended: no  Symptomatic treatment for traveler's diarrhea: has zofran/zithromax    ASSESSMENT/PLAN:  1. Need for vaccination  Given #2  - HUMAN PAPILLOMA VIRUS (GARDASIL 9) VACCINE [81309]  - 1st  Administration  [46703]     2. Obesity  Weight loss encouraged with healthy eating, etc

## 2021-01-15 ENCOUNTER — HEALTH MAINTENANCE LETTER (OUTPATIENT)
Age: 26
End: 2021-01-15

## 2021-09-05 ENCOUNTER — HEALTH MAINTENANCE LETTER (OUTPATIENT)
Age: 26
End: 2021-09-05

## 2022-02-20 ENCOUNTER — HEALTH MAINTENANCE LETTER (OUTPATIENT)
Age: 27
End: 2022-02-20

## 2022-10-23 ENCOUNTER — HEALTH MAINTENANCE LETTER (OUTPATIENT)
Age: 27
End: 2022-10-23

## 2023-04-02 ENCOUNTER — HEALTH MAINTENANCE LETTER (OUTPATIENT)
Age: 28
End: 2023-04-02